# Patient Record
Sex: FEMALE | URBAN - METROPOLITAN AREA
[De-identification: names, ages, dates, MRNs, and addresses within clinical notes are randomized per-mention and may not be internally consistent; named-entity substitution may affect disease eponyms.]

---

## 2022-10-19 ENCOUNTER — OFFICE VISIT (OUTPATIENT)
Dept: URBAN - METROPOLITAN AREA CLINIC 80 | Facility: CLINIC | Age: 27
End: 2022-10-19
Payer: COMMERCIAL

## 2022-10-19 ENCOUNTER — DASHBOARD ENCOUNTERS (OUTPATIENT)
Age: 27
End: 2022-10-19

## 2022-10-19 VITALS
TEMPERATURE: 97.7 F | WEIGHT: 188 LBS | HEIGHT: 63 IN | SYSTOLIC BLOOD PRESSURE: 113 MMHG | HEART RATE: 74 BPM | DIASTOLIC BLOOD PRESSURE: 77 MMHG | BODY MASS INDEX: 33.31 KG/M2

## 2022-10-19 DIAGNOSIS — R10.11 RUQ PAIN: ICD-10-CM

## 2022-10-19 DIAGNOSIS — R93.89 ABNORMAL ULTRASOUND: ICD-10-CM

## 2022-10-19 PROBLEM — 169255008: Status: ACTIVE | Noted: 2022-10-19

## 2022-10-19 PROCEDURE — 99204 OFFICE O/P NEW MOD 45 MIN: CPT | Performed by: PHYSICIAN ASSISTANT

## 2022-10-19 NOTE — HPI-TODAY'S VISIT:
Pt was having increased RUQ pain and presented to ER 9/11/22 -- was having emesis - increased pain - thought it was her gallbladder - had same symptoms a few years ago too and went to er then as well Had RUQ ultrasound dons showing diffuse gallbladder wall thickening CT showed diffuse thickening GB and left ovarian cyst LFTs were normal WBC was 15.11 the pain has now gone away Paternal grandfather had his GB removed Currently - no nausea or emesis, no fevers or chills No heartburn or indigestion no dysphagia normal bowel habit is 1 BM a day - some days can go 3 days without a BM and some days diarrhea - alternates at times - not consistent no brbpr or melena